# Patient Record
Sex: MALE | Race: OTHER | NOT HISPANIC OR LATINO | Employment: UNEMPLOYED | URBAN - METROPOLITAN AREA
[De-identification: names, ages, dates, MRNs, and addresses within clinical notes are randomized per-mention and may not be internally consistent; named-entity substitution may affect disease eponyms.]

---

## 2022-08-31 ENCOUNTER — HOSPITAL ENCOUNTER (OUTPATIENT)
Facility: HOSPITAL | Age: 42
Discharge: HOME | End: 2022-09-01
Attending: SURGERY | Admitting: SURGERY

## 2022-08-31 ENCOUNTER — ANESTHESIA EVENT (OUTPATIENT)
Dept: SURGERY | Facility: HOSPITAL | Age: 42
End: 2022-08-31

## 2022-08-31 ENCOUNTER — ANESTHESIA (OUTPATIENT)
Dept: SURGERY | Facility: HOSPITAL | Age: 42
End: 2022-08-31

## 2022-08-31 DIAGNOSIS — K61.0 PERIANAL ABSCESS: ICD-10-CM

## 2022-08-31 DIAGNOSIS — K61.1 PERIRECTAL ABSCESS: Primary | ICD-10-CM

## 2022-08-31 LAB
ALBUMIN SERPL-MCNC: 3.9 GM/DL (ref 3.5–5)
ALBUMIN/GLOB SERPL: 1 RATIO (ref 1.1–2)
ALP SERPL-CCNC: 122 UNIT/L (ref 40–150)
ALT SERPL-CCNC: 25 UNIT/L (ref 0–55)
AST SERPL-CCNC: 14 UNIT/L (ref 5–34)
BASOPHILS # BLD AUTO: 0.06 X10(3)/MCL (ref 0–0.2)
BASOPHILS NFR BLD AUTO: 0.5 %
BILIRUBIN DIRECT+TOT PNL SERPL-MCNC: 1 MG/DL
BUN SERPL-MCNC: 20.2 MG/DL (ref 8.9–20.6)
CALCIUM SERPL-MCNC: 10 MG/DL (ref 8.4–10.2)
CHLORIDE SERPL-SCNC: 100 MMOL/L (ref 98–107)
CO2 SERPL-SCNC: 22 MMOL/L (ref 22–29)
CREAT SERPL-MCNC: 0.93 MG/DL (ref 0.73–1.18)
EOSINOPHIL # BLD AUTO: 0.21 X10(3)/MCL (ref 0–0.9)
EOSINOPHIL NFR BLD AUTO: 1.9 %
ERYTHROCYTE [DISTWIDTH] IN BLOOD BY AUTOMATED COUNT: 12.2 % (ref 11.5–17)
EST. AVERAGE GLUCOSE BLD GHB EST-MCNC: 277.6 MG/DL
GFR SERPLBLD CREATININE-BSD FMLA CKD-EPI: >60 MLS/MIN/1.73/M2
GLOBULIN SER-MCNC: 3.8 GM/DL (ref 2.4–3.5)
GLUCOSE SERPL-MCNC: 301 MG/DL (ref 74–100)
HBA1C MFR BLD: 11.3 %
HCT VFR BLD AUTO: 47.3 % (ref 42–52)
HGB BLD-MCNC: 15.9 GM/DL (ref 14–18)
IMM GRANULOCYTES # BLD AUTO: 0.08 X10(3)/MCL (ref 0–0.04)
IMM GRANULOCYTES NFR BLD AUTO: 0.7 %
LYMPHOCYTES # BLD AUTO: 1.73 X10(3)/MCL (ref 0.6–4.6)
LYMPHOCYTES NFR BLD AUTO: 15.3 %
MCH RBC QN AUTO: 28.3 PG (ref 27–31)
MCHC RBC AUTO-ENTMCNC: 33.6 MG/DL (ref 33–36)
MCV RBC AUTO: 84.3 FL (ref 80–94)
MONOCYTES # BLD AUTO: 0.93 X10(3)/MCL (ref 0.1–1.3)
MONOCYTES NFR BLD AUTO: 8.2 %
NEUTROPHILS # BLD AUTO: 8.3 X10(3)/MCL (ref 2.1–9.2)
NEUTROPHILS NFR BLD AUTO: 73.4 %
NRBC BLD AUTO-RTO: 0 %
PLATELET # BLD AUTO: 294 X10(3)/MCL (ref 130–400)
PMV BLD AUTO: 11.1 FL (ref 7.4–10.4)
POCT GLUCOSE: 221 MG/DL (ref 70–110)
POCT GLUCOSE: 233 MG/DL (ref 70–110)
POCT GLUCOSE: 283 MG/DL (ref 70–110)
POCT GLUCOSE: 376 MG/DL (ref 70–110)
POTASSIUM SERPL-SCNC: 3.5 MMOL/L (ref 3.5–5.1)
PROT SERPL-MCNC: 7.7 GM/DL (ref 6.4–8.3)
RBC # BLD AUTO: 5.61 X10(6)/MCL (ref 4.7–6.1)
SARS-COV-2 RDRP RESP QL NAA+PROBE: NEGATIVE
SODIUM SERPL-SCNC: 136 MMOL/L (ref 136–145)
WBC # SPEC AUTO: 11.3 X10(3)/MCL (ref 4.5–11.5)

## 2022-08-31 PROCEDURE — 85025 COMPLETE CBC W/AUTO DIFF WBC: CPT | Performed by: PHYSICIAN ASSISTANT

## 2022-08-31 PROCEDURE — 37000008 HC ANESTHESIA 1ST 15 MINUTES: Performed by: SURGERY

## 2022-08-31 PROCEDURE — 87635 SARS-COV-2 COVID-19 AMP PRB: CPT | Performed by: PHYSICIAN ASSISTANT

## 2022-08-31 PROCEDURE — G0378 HOSPITAL OBSERVATION PER HR: HCPCS

## 2022-08-31 PROCEDURE — 96372 THER/PROPH/DIAG INJ SC/IM: CPT | Performed by: STUDENT IN AN ORGANIZED HEALTH CARE EDUCATION/TRAINING PROGRAM

## 2022-08-31 PROCEDURE — 63600175 PHARM REV CODE 636 W HCPCS: Performed by: STUDENT IN AN ORGANIZED HEALTH CARE EDUCATION/TRAINING PROGRAM

## 2022-08-31 PROCEDURE — 83036 HEMOGLOBIN GLYCOSYLATED A1C: CPT | Performed by: STUDENT IN AN ORGANIZED HEALTH CARE EDUCATION/TRAINING PROGRAM

## 2022-08-31 PROCEDURE — 25000003 PHARM REV CODE 250: Performed by: STUDENT IN AN ORGANIZED HEALTH CARE EDUCATION/TRAINING PROGRAM

## 2022-08-31 PROCEDURE — 63600175 PHARM REV CODE 636 W HCPCS: Performed by: ANESTHESIOLOGY

## 2022-08-31 PROCEDURE — 80053 COMPREHEN METABOLIC PANEL: CPT | Performed by: PHYSICIAN ASSISTANT

## 2022-08-31 PROCEDURE — 63600175 PHARM REV CODE 636 W HCPCS: Performed by: PHYSICIAN ASSISTANT

## 2022-08-31 PROCEDURE — 63600175 PHARM REV CODE 636 W HCPCS: Performed by: NURSE ANESTHETIST, CERTIFIED REGISTERED

## 2022-08-31 PROCEDURE — 37000009 HC ANESTHESIA EA ADD 15 MINS: Performed by: SURGERY

## 2022-08-31 PROCEDURE — 25000003 PHARM REV CODE 250: Performed by: NURSE ANESTHETIST, CERTIFIED REGISTERED

## 2022-08-31 PROCEDURE — 71000033 HC RECOVERY, INTIAL HOUR: Performed by: SURGERY

## 2022-08-31 PROCEDURE — 25000242 PHARM REV CODE 250 ALT 637 W/ HCPCS

## 2022-08-31 PROCEDURE — 96375 TX/PRO/DX INJ NEW DRUG ADDON: CPT

## 2022-08-31 PROCEDURE — 36415 COLL VENOUS BLD VENIPUNCTURE: CPT | Performed by: PHYSICIAN ASSISTANT

## 2022-08-31 PROCEDURE — 96365 THER/PROPH/DIAG IV INF INIT: CPT

## 2022-08-31 PROCEDURE — 82962 GLUCOSE BLOOD TEST: CPT

## 2022-08-31 PROCEDURE — 25500020 PHARM REV CODE 255: Performed by: PHYSICIAN ASSISTANT

## 2022-08-31 PROCEDURE — 36000704 HC OR TIME LEV I 1ST 15 MIN: Performed by: SURGERY

## 2022-08-31 PROCEDURE — 99285 EMERGENCY DEPT VISIT HI MDM: CPT | Mod: 25

## 2022-08-31 PROCEDURE — 36000705 HC OR TIME LEV I EA ADD 15 MIN: Performed by: SURGERY

## 2022-08-31 RX ORDER — GLUCAGON 1 MG
1 KIT INJECTION
Status: DISCONTINUED | OUTPATIENT
Start: 2022-08-31 | End: 2022-09-01 | Stop reason: HOSPADM

## 2022-08-31 RX ORDER — SODIUM CHLORIDE 0.9 % (FLUSH) 0.9 %
10 SYRINGE (ML) INJECTION
Status: DISCONTINUED | OUTPATIENT
Start: 2022-08-31 | End: 2022-09-01 | Stop reason: HOSPADM

## 2022-08-31 RX ORDER — MORPHINE SULFATE 2 MG/ML
2 INJECTION, SOLUTION INTRAMUSCULAR; INTRAVENOUS EVERY 5 MIN PRN
Status: DISCONTINUED | OUTPATIENT
Start: 2022-08-31 | End: 2022-08-31

## 2022-08-31 RX ORDER — ACETAMINOPHEN 325 MG/1
650 TABLET ORAL EVERY 8 HOURS PRN
Status: DISCONTINUED | OUTPATIENT
Start: 2022-08-31 | End: 2022-09-01 | Stop reason: HOSPADM

## 2022-08-31 RX ORDER — INSULIN ASPART 100 [IU]/ML
1-10 INJECTION, SOLUTION INTRAVENOUS; SUBCUTANEOUS EVERY 6 HOURS PRN
Status: DISCONTINUED | OUTPATIENT
Start: 2022-08-31 | End: 2022-09-01 | Stop reason: HOSPADM

## 2022-08-31 RX ORDER — OXYCODONE HYDROCHLORIDE 5 MG/1
5 TABLET ORAL EVERY 4 HOURS PRN
Status: DISCONTINUED | OUTPATIENT
Start: 2022-08-31 | End: 2022-09-01 | Stop reason: HOSPADM

## 2022-08-31 RX ORDER — SODIUM CHLORIDE, SODIUM LACTATE, POTASSIUM CHLORIDE, CALCIUM CHLORIDE 600; 310; 30; 20 MG/100ML; MG/100ML; MG/100ML; MG/100ML
INJECTION, SOLUTION INTRAVENOUS CONTINUOUS
Status: DISCONTINUED | OUTPATIENT
Start: 2022-08-31 | End: 2022-09-01 | Stop reason: HOSPADM

## 2022-08-31 RX ORDER — MIDAZOLAM HYDROCHLORIDE 1 MG/ML
2 INJECTION INTRAMUSCULAR; INTRAVENOUS ONCE AS NEEDED
Status: COMPLETED | OUTPATIENT
Start: 2022-08-31 | End: 2022-08-31

## 2022-08-31 RX ORDER — KETOROLAC TROMETHAMINE 30 MG/ML
INJECTION, SOLUTION INTRAMUSCULAR; INTRAVENOUS
Status: DISCONTINUED | OUTPATIENT
Start: 2022-08-31 | End: 2022-08-31

## 2022-08-31 RX ORDER — MEPERIDINE HYDROCHLORIDE 25 MG/ML
12.5 INJECTION INTRAMUSCULAR; INTRAVENOUS; SUBCUTANEOUS EVERY 10 MIN PRN
Status: DISCONTINUED | OUTPATIENT
Start: 2022-08-31 | End: 2022-08-31

## 2022-08-31 RX ORDER — MIDAZOLAM HYDROCHLORIDE 1 MG/ML
INJECTION INTRAMUSCULAR; INTRAVENOUS
Status: DISPENSED
Start: 2022-08-31 | End: 2022-09-01

## 2022-08-31 RX ORDER — ONDANSETRON 2 MG/ML
4 INJECTION INTRAMUSCULAR; INTRAVENOUS DAILY PRN
Status: DISCONTINUED | OUTPATIENT
Start: 2022-08-31 | End: 2022-08-31

## 2022-08-31 RX ORDER — IPRATROPIUM BROMIDE AND ALBUTEROL SULFATE 2.5; .5 MG/3ML; MG/3ML
SOLUTION RESPIRATORY (INHALATION)
Status: COMPLETED
Start: 2022-08-31 | End: 2022-08-31

## 2022-08-31 RX ORDER — ROCURONIUM BROMIDE 10 MG/ML
INJECTION, SOLUTION INTRAVENOUS
Status: DISCONTINUED | OUTPATIENT
Start: 2022-08-31 | End: 2022-08-31

## 2022-08-31 RX ORDER — TALC
6 POWDER (GRAM) TOPICAL NIGHTLY PRN
Status: DISCONTINUED | OUTPATIENT
Start: 2022-08-31 | End: 2022-09-01 | Stop reason: HOSPADM

## 2022-08-31 RX ORDER — PROPOFOL 10 MG/ML
VIAL (ML) INTRAVENOUS
Status: DISCONTINUED | OUTPATIENT
Start: 2022-08-31 | End: 2022-08-31

## 2022-08-31 RX ORDER — GLYCOPYRROLATE 0.2 MG/ML
INJECTION INTRAMUSCULAR; INTRAVENOUS
Status: DISCONTINUED | OUTPATIENT
Start: 2022-08-31 | End: 2022-08-31

## 2022-08-31 RX ORDER — LIDOCAINE HYDROCHLORIDE 20 MG/ML
INJECTION, SOLUTION EPIDURAL; INFILTRATION; INTRACAUDAL; PERINEURAL
Status: DISCONTINUED | OUTPATIENT
Start: 2022-08-31 | End: 2022-08-31

## 2022-08-31 RX ORDER — LABETALOL HYDROCHLORIDE 5 MG/ML
INJECTION, SOLUTION INTRAVENOUS
Status: DISCONTINUED | OUTPATIENT
Start: 2022-08-31 | End: 2022-08-31

## 2022-08-31 RX ORDER — INSULIN ASPART 100 [IU]/ML
INJECTION, SOLUTION INTRAVENOUS; SUBCUTANEOUS
Status: DISPENSED
Start: 2022-08-31 | End: 2022-09-01

## 2022-08-31 RX ORDER — ONDANSETRON 2 MG/ML
INJECTION INTRAMUSCULAR; INTRAVENOUS
Status: DISCONTINUED | OUTPATIENT
Start: 2022-08-31 | End: 2022-08-31

## 2022-08-31 RX ORDER — KETOROLAC TROMETHAMINE 30 MG/ML
15 INJECTION, SOLUTION INTRAMUSCULAR; INTRAVENOUS
Status: COMPLETED | OUTPATIENT
Start: 2022-08-31 | End: 2022-08-31

## 2022-08-31 RX ORDER — HEPARIN SODIUM 5000 [USP'U]/ML
5000 INJECTION, SOLUTION INTRAVENOUS; SUBCUTANEOUS
Status: DISCONTINUED | OUTPATIENT
Start: 2022-08-31 | End: 2022-09-01 | Stop reason: HOSPADM

## 2022-08-31 RX ORDER — NEOSTIGMINE METHYLSULFATE 1 MG/ML
INJECTION, SOLUTION INTRAVENOUS
Status: DISCONTINUED | OUTPATIENT
Start: 2022-08-31 | End: 2022-08-31

## 2022-08-31 RX ORDER — OXYCODONE HYDROCHLORIDE 5 MG/1
5 TABLET ORAL
Status: DISCONTINUED | OUTPATIENT
Start: 2022-08-31 | End: 2022-08-31

## 2022-08-31 RX ORDER — FENTANYL CITRATE 50 UG/ML
INJECTION, SOLUTION INTRAMUSCULAR; INTRAVENOUS
Status: DISCONTINUED | OUTPATIENT
Start: 2022-08-31 | End: 2022-08-31

## 2022-08-31 RX ADMIN — LIDOCAINE HYDROCHLORIDE 40 MG: 20 INJECTION, SOLUTION EPIDURAL; INFILTRATION; INTRACAUDAL; PERINEURAL at 04:08

## 2022-08-31 RX ADMIN — INSULIN ASPART 5 UNITS: 100 INJECTION, SOLUTION INTRAVENOUS; SUBCUTANEOUS at 11:08

## 2022-08-31 RX ADMIN — IOHEXOL 100 ML: 350 INJECTION, SOLUTION INTRAVENOUS at 01:08

## 2022-08-31 RX ADMIN — KETOROLAC TROMETHAMINE 30 MG: 30 INJECTION, SOLUTION INTRAMUSCULAR; INTRAVENOUS at 04:08

## 2022-08-31 RX ADMIN — HEPARIN SODIUM 5000 UNITS: 5000 INJECTION, SOLUTION INTRAVENOUS; SUBCUTANEOUS at 03:08

## 2022-08-31 RX ADMIN — FENTANYL CITRATE 50 MCG: 50 INJECTION, SOLUTION INTRAMUSCULAR; INTRAVENOUS at 04:08

## 2022-08-31 RX ADMIN — ONDANSETRON 4 MG: 2 INJECTION INTRAMUSCULAR; INTRAVENOUS at 04:08

## 2022-08-31 RX ADMIN — LABETALOL HYDROCHLORIDE 2.5 MG: 5 INJECTION, SOLUTION INTRAVENOUS at 04:08

## 2022-08-31 RX ADMIN — FENTANYL CITRATE 25 MCG: 50 INJECTION, SOLUTION INTRAMUSCULAR; INTRAVENOUS at 04:08

## 2022-08-31 RX ADMIN — PIPERACILLIN AND TAZOBACTAM 4.5 G: 4; .5 INJECTION, POWDER, LYOPHILIZED, FOR SOLUTION INTRAVENOUS; PARENTERAL at 03:08

## 2022-08-31 RX ADMIN — INSULIN ASPART 3 UNITS: 100 INJECTION, SOLUTION INTRAVENOUS; SUBCUTANEOUS at 04:08

## 2022-08-31 RX ADMIN — PROPOFOL 200 MG: 10 INJECTION, EMULSION INTRAVENOUS at 04:08

## 2022-08-31 RX ADMIN — MIDAZOLAM HYDROCHLORIDE 2 MG: 1 INJECTION, SOLUTION INTRAMUSCULAR; INTRAVENOUS at 03:08

## 2022-08-31 RX ADMIN — ACETAMINOPHEN 650 MG: 325 TABLET ORAL at 08:08

## 2022-08-31 RX ADMIN — GLYCOPYRROLATE 0.8 MG: 0.2 INJECTION INTRAMUSCULAR; INTRAVENOUS at 04:08

## 2022-08-31 RX ADMIN — IPRATROPIUM BROMIDE AND ALBUTEROL SULFATE 3 ML: .5; 3 SOLUTION RESPIRATORY (INHALATION) at 05:08

## 2022-08-31 RX ADMIN — KETOROLAC TROMETHAMINE 15 MG: 30 INJECTION, SOLUTION INTRAMUSCULAR; INTRAVENOUS at 11:08

## 2022-08-31 RX ADMIN — PIPERACILLIN AND TAZOBACTAM 4.5 G: 4; .5 INJECTION, POWDER, LYOPHILIZED, FOR SOLUTION INTRAVENOUS; PARENTERAL at 11:08

## 2022-08-31 RX ADMIN — ROCURONIUM BROMIDE 40 MG: 10 SOLUTION INTRAVENOUS at 04:08

## 2022-08-31 RX ADMIN — NEOSTIGMINE METHYLSULFATE 5 MG: 1 INJECTION INTRAVENOUS at 04:08

## 2022-08-31 RX ADMIN — SODIUM CHLORIDE, POTASSIUM CHLORIDE, SODIUM LACTATE AND CALCIUM CHLORIDE: 600; 310; 30; 20 INJECTION, SOLUTION INTRAVENOUS at 03:08

## 2022-08-31 NOTE — H&P
General/Acute Care Surgery   History and Physical     HD#0  POD#* No surgery date entered *    HPI  42M presents with perianal pain for 3 days. Denies f/c/n/v. Last meal was yesterday. He has never had this pain before.     Past Medical History:   ----------------------------  Diabetes mellitus    Surgical History:   No past surgical history on file.    Social History:   Social History     Tobacco Use    Smoking status: Every Day     Types: Cigarettes    Smokeless tobacco: Never       Review of Systems: 10 point ROS negative except as stated in HPI    Scheduled Meds:   piperacillin-tazobactam (ZOSYN) IVPB  4.5 g Intravenous Q8H       Continuous Infusions:    PRN Meds:acetaminophen, dextrose 10%, dextrose 10%, glucagon (human recombinant), heparin (porcine), insulin aspart U-100, melatonin, oxyCODONE, sodium chloride 0.9%     Objective  Temp:  [97.9 °F (36.6 °C)] 97.9 °F (36.6 °C)  Pulse:  [72-99] 83  Resp:  [16] 16  SpO2:  [98 %-100 %] 100 %  BP: (125-147)/() 133/85     No intake/output data recorded.  No intake/output data recorded.     GEN: NAD   NEURO: AAOx3   RESP: No increased WOB, equal rise and fall of the chest   CV: Regular rate   ABD: Soft, non tender, non distended. No guarding or rebound   ENID: fluctuant, tender mass felt on left of anal verge about 3cm diameter  EXT: Moving all extremities spontaneously      Labs  Recent Labs     08/31/22  1152   WBC 11.3   HGB 15.9   HCT 47.3        Recent Labs     08/31/22  1152      K 3.5   CO2 22   BUN 20.2   CREATININE 0.93   CALCIUM 10.0   ALBUMIN 3.9   BILITOT 1.0   AST 14   ALKPHOS 122   ALT 25       Imaging  CT Abdomen Pelvis With Contrast   Final Result      Perianal abscess seen on the left side as outlined above         Electronically signed by: Jaron Aguilar   Date:    08/31/2022   Time:    13:53           Assessment/Plan  42 y.o.male with perianal abscess.    - Admit to general surgery   - Will take to OR now    Desean Koo  MD [facility] General Surgery    8/31/2022  2:40 PM

## 2022-08-31 NOTE — ANESTHESIA POSTPROCEDURE EVALUATION
Anesthesia Post Evaluation    Patient: Les Najera-Esquipulitas    Procedure(s) Performed: Procedure(s) (LRB):  INCISION AND DRAINAGE, BUTTOCK (N/A)    Final Anesthesia Type: general      Patient location during evaluation: PACU  Level of consciousness: sedated  Post-procedure vital signs: reviewed and stable  Airway patency: patent      Anesthetic complications: no      Cardiovascular status: hemodynamically stable  Respiratory status: spontaneous ventilation  Follow-up not needed.          Vitals Value Taken Time   /75 08/31/22 1638   Temp 36.4 °C (97.5 °F) 08/31/22 1638   Pulse 91 08/31/22 1643   Resp 17 08/31/22 1643   SpO2 97 % 08/31/22 1643         No case tracking events are documented in the log.      Pain/Adeline Score: Pain Rating Prior to Med Admin: 10 (8/31/2022 11:55 AM)  Adeline Score: 9 (8/31/2022  4:51 PM)

## 2022-08-31 NOTE — TRANSFER OF CARE
"Anesthesia Transfer of Care Note    Patient: Les Najera-Feleciaqumarylu    Procedure(s) Performed: Procedure(s) (LRB):  INCISION AND DRAINAGE, BUTTOCK (N/A)    Patient location: PACU    Anesthesia Type: general    Transport from OR: Transported from OR on room air with adequate spontaneous ventilation    Post pain: adequate analgesia    Post assessment: no apparent anesthetic complications and tolerated procedure well    Post vital signs: stable    Level of consciousness: sedated    Nausea/Vomiting: no nausea/vomiting    Complications: none    Transfer of care protocol was followed      Last vitals:   Visit Vitals  /88   Pulse 79   Temp 36.6 °C (97.9 °F) (Tympanic)   Resp 16   Ht 5' 6" (1.676 m)   Wt 92 kg (202 lb 13.2 oz)   SpO2 98%   BMI 32.74 kg/m²     "

## 2022-08-31 NOTE — OP NOTE
[facility]  General Surgery  Operative Note    Date of Procedure: 08/31/2022     Procedure: Incision and Drainage, buttocks    Surgeon(s) and Role:  Staff: Shraddha Frost MD  Resident(s): MD Jennifer Butcher MD    Pre-Operative Diagnosis: Perianal Abscess    Post-Operative Diagnosis: Same    Anesthesia: General    Operative Findings: Perianal abscess to Left buttocks, no fistula noticed    Description of Technical Procedures:   Pt was positioned in lithotomy position. DVT and antibiotic prophylaxis were administered. The patient was prepped in the usual fashion using sterile technique. A timeout was walled acknowledging correct pation, allergies, and procedure. A 2cm incision was made approximately 4cm from the anal verge at the 5oclock position. A hemostat was inserted and opened as to drain all appreciable pockets of pus. Bloody purulence poured out approximately 10mL. The pocket was probed and no more pus was appreciated. The pocket was then packed with iodoform packing leaving a 5cm tail. The area was cleaned and the incision was dressed with 4x4s and a piece of tape. All counts correct. Patient was extubated and taken to PACU in stable condition.     Estimated Blood Loss (EBL): 15 cc           Implants: iodoform packing    Drains: none    Specimens: none    Complications: None            Condition: good     Disposition: hospital observation    Desean Koo MD   [facility] General Surgery, PGY1  08/31/2022 4:41 PM

## 2022-08-31 NOTE — ANESTHESIA PROCEDURE NOTES
Intubation    Date/Time: 8/31/2022 4:07 PM  Performed by: Raghavendra Marino CRNA  Authorized by: Mickie Joyce MD     Intubation:     Induction:  Intravenous    Intubated:  Postinduction    Mask Ventilation:  Easy with oral airway    Attempts:  1    Attempted By:  CRNA    Method of Intubation:  Direct    Blade:  Mari 3    Laryngeal View Grade: Grade IIA - cords partially seen      Difficult Airway Encountered?: No      Complications:  None    Airway Device:  Oral endotracheal tube    Airway Device Size:  7.5    Style/Cuff Inflation:  Cuffed (inflated to minimal occlusive pressure)    Inflation Amount (mL):  6    Tube secured:  21    Secured at:  The lips    Placement Verified By:  Capnometry    Complicating Factors:  None    Findings Post-Intubation:  BS equal bilateral

## 2022-08-31 NOTE — ANESTHESIA PREPROCEDURE EVALUATION
08/31/2022  Les Blevins is a 42 y.o., male with PMHx of obesity, smoking presents for I&D buttocks.    NO COVID VACCINE DOCUMENTATION ON CHART   Latest Reference Range & Units 08/31/22 14:18   ID NOW COVID-19, (WILL) Negative  Negative     NO BETA BLOCKER    Active Ambulatory Problems     Diagnosis Date Noted    No Active Ambulatory Problems     Resolved Ambulatory Problems     Diagnosis Date Noted    No Resolved Ambulatory Problems     Past Medical History:   Diagnosis Date    Diabetes mellitus            Pre-op Assessment    I have reviewed the NPO Status.      Review of Systems  Anesthesia Hx:  No problems with previous Anesthesia    Social:  Smoker    Cardiovascular:  Cardiovascular Normal     Pulmonary:  Pulmonary Normal    Renal/:  Renal/ Normal     Hepatic/GI:  Hepatic/GI Normal    Neurological:  Neurology Normal    Endocrine:   Diabetes, poorly controlled, type 2  Obesity / BMI > 30    Vitals:    08/31/22 1300 08/31/22 1315 08/31/22 1416 08/31/22 1418   BP: 125/76  133/85    BP Location:       Patient Position:       Pulse: 89 72 86 83   Resp:       Temp:       TempSrc:       SpO2: 98% 98% 98% 100%   Weight:       Height:             Physical Exam  General: Alert, Cooperative and Well nourished    Airway:  Mallampati: II   Mouth Opening: Normal  TM Distance: Normal  Tongue: Normal  Neck ROM: Normal ROM    Dental:  Intact    Chest/Lungs:  Clear to auscultation, Normal Respiratory Rate    Heart:  Rate: Normal  Rhythm: Regular Rhythm  Sounds: Normal      Lab Results   Component Value Date    WBC 11.3 08/31/2022    HGB 15.9 08/31/2022    HCT 47.3 08/31/2022    MCV 84.3 08/31/2022     08/31/2022      Latest Reference Range & Units 08/31/22 11:08   POCT Glucose 70 - 110 mg/dL 283 (H)   (H): Data is abnormally high    CMP  Sodium Level   Date Value Ref Range Status   08/31/2022 136  136 - 145 mmol/L Final     Potassium Level   Date Value Ref Range Status   08/31/2022 3.5 3.5 - 5.1 mmol/L Final     Carbon Dioxide   Date Value Ref Range Status   08/31/2022 22 22 - 29 mmol/L Final     Blood Urea Nitrogen   Date Value Ref Range Status   08/31/2022 20.2 8.9 - 20.6 mg/dL Final     Creatinine   Date Value Ref Range Status   08/31/2022 0.93 0.73 - 1.18 mg/dL Final     Calcium Level Total   Date Value Ref Range Status   08/31/2022 10.0 8.4 - 10.2 mg/dL Final     Albumin Level   Date Value Ref Range Status   08/31/2022 3.9 3.5 - 5.0 gm/dL Final     Bilirubin Total   Date Value Ref Range Status   08/31/2022 1.0 <=1.5 mg/dL Final     Alkaline Phosphatase   Date Value Ref Range Status   08/31/2022 122 40 - 150 unit/L Final     Aspartate Aminotransferase   Date Value Ref Range Status   08/31/2022 14 5 - 34 unit/L Final     Alanine Aminotransferase   Date Value Ref Range Status   08/31/2022 25 0 - 55 unit/L Final     Lab Results   Component Value Date    HGBA1C 11.3 (H) 08/31/2022       Anesthesia Plan  Type of Anesthesia, risks & benefits discussed:    Anesthesia Type: Gen ETT  Intra-op Monitoring Plan: Standard ASA Monitors  Post Op Pain Control Plan: IV/PO Opioids PRN  Induction:  IV  Airway Plan: Direct  Informed Consent: Informed consent signed with the Patient and all parties understand the risks and agree with anesthesia plan.  All questions answered.   ASA Score: 3  Day of Surgery Review of History & Physical: H&P Update referred to the surgeon/provider.    Ready For Surgery From Anesthesia Perspective.     .

## 2022-08-31 NOTE — ED PROVIDER NOTES
Encounter Date: 8/31/2022       History     Chief Complaint   Patient presents with    Abscess     PT UNSURE IF ABSCESS TO RECTAL AREA, CO PAIN X 2 DAYS.  DENIES DRAINAGE. HX OF DM OUT OF MEDS FOR MONTHS. NO PCP.  .      Reinaldo Hua is a 42 y.o. male who presents to the ED with complaints of a rectal abscess that started 2 day(s) ago. He states their is pain to the area. He denies drainage. Hx of DM.  in triage.        The history is provided by the patient. No  was used.   Review of patient's allergies indicates:  No Known Allergies  Past Medical History:   Diagnosis Date    Diabetes mellitus      No past surgical history on file.  No family history on file.  Social History     Tobacco Use    Smoking status: Every Day     Types: Cigarettes    Smokeless tobacco: Never     Review of Systems   Constitutional:  Negative for chills, fatigue and fever.   HENT:  Negative for congestion, ear pain, sinus pain and sore throat.    Eyes:  Negative for pain.   Respiratory:  Negative for cough, chest tightness and shortness of breath.    Cardiovascular:  Negative for chest pain.   Gastrointestinal:  Negative for abdominal pain, constipation, diarrhea, nausea and vomiting.   Genitourinary:  Negative for dysuria.   Musculoskeletal:  Negative for back pain and joint swelling.   Skin:  Positive for wound. Negative for color change and rash.   Neurological:  Negative for dizziness and weakness.   Psychiatric/Behavioral:  Negative for behavioral problems and confusion.      Physical Exam     Initial Vitals [08/31/22 1110]   BP Pulse Resp Temp SpO2   (!) 143/100 99 16 97.9 °F (36.6 °C) 99 %      MAP       --         Physical Exam    Constitutional: He appears well-developed and well-nourished.   HENT:   Head: Normocephalic and atraumatic.   Nose: Nose normal.   Eyes: Conjunctivae and EOM are normal. Pupils are equal, round, and reactive to light.   Neck: Neck supple. No JVD present.    Normal range of motion.  Cardiovascular:  Normal rate, regular rhythm, normal heart sounds and intact distal pulses.           No murmur heard.  Pulmonary/Chest: Breath sounds normal. No respiratory distress. He has no wheezes. He has no rhonchi. He has no rales.   Abdominal: Abdomen is soft. Bowel sounds are normal. He exhibits no distension. There is no abdominal tenderness. There is no rebound and no guarding.   Genitourinary: Rectum:      Tenderness (perianal abscess felt on rectal exam) present.     Musculoskeletal:         General: No tenderness. Normal range of motion.      Cervical back: Normal range of motion and neck supple.     Lymphadenopathy:     He has no cervical adenopathy.   Neurological: He is alert.   Skin: Skin is warm. Abscess noted.   Psychiatric: He has a normal mood and affect. Thought content normal.       ED Course   Procedures  Labs Reviewed   COMPREHENSIVE METABOLIC PANEL - Abnormal; Notable for the following components:       Result Value    Glucose Level 301 (*)     Globulin 3.8 (*)     Albumin/Globulin Ratio 1.0 (*)     All other components within normal limits   CBC WITH DIFFERENTIAL - Abnormal; Notable for the following components:    MPV 11.1 (*)     IG# 0.08 (*)     All other components within normal limits   POCT GLUCOSE - Abnormal; Notable for the following components:    POCT Glucose 283 (*)     All other components within normal limits   CBC W/ AUTO DIFFERENTIAL    Narrative:     The following orders were created for panel order CBC auto differential.  Procedure                               Abnormality         Status                     ---------                               -----------         ------                     CBC with Differential[[id number]]        Abnormal            Final result                 Please view results for these tests on the individual orders.   EXTRA TUBES    Narrative:     The following orders were created for panel order EXTRA TUBES.  Procedure                                Abnormality         Status                     ---------                               -----------         ------                     Light Blue Top Hold[[id number]]                              In process                 Red Top Hold[[id number]]                                     In process                 Gold Top Hold[[id number]]                                    In process                   Please view results for these tests on the individual orders.   LIGHT BLUE TOP HOLD   RED TOP HOLD   GOLD TOP HOLD   SARS-COV-2 RNA AMPLIFICATION, QUAL   HEMOGLOBIN A1C   POCT GLUCOSE MONITORING CONTINUOUS          Imaging Results              CT Abdomen Pelvis With Contrast (Final result)  Result time 08/31/22 13:53:49      Final result by Jaron Aguilar MD (08/31/22 13:53:49)                   Impression:      Perianal abscess seen on the left side as outlined above      Electronically signed by: Jaron Aguilar  Date:    08/31/2022  Time:    13:53               Narrative:    EXAMINATION:  CT ABDOMEN PELVIS WITH CONTRAST    CLINICAL HISTORY:  concern for perianal abscess;    TECHNIQUE:  Low dose axial images, sagittal and coronal reformations were obtained from the lung bases to the pubic symphysis following the IV administration of contrast. Automatic exposure control (AEC) is utilized to reduce patient radiation exposure.    COMPARISON:  None.    FINDINGS:  The lung bases are clear.    The liver appears normal.  No liver mass or lesion is seen.  Portal and hepatic veins appear normal.    The gallbladder appears normal.  No obvious gallstones are seen.  No biliary dilatation is seen.  No pericholecystic fluid is seen.    The pancreas appears normal.  No pancreatic mass or lesion is seen.    The spleen shows no acute abnormality.    The adrenal glands appear normal.  No adrenal nodule is seen.    The kidneys appear normal in size.  There is some simple cysts seen in both kidneys..  No  hydronephrosis is seen.  No hydroureter is seen.  No nephrolithiasis is seen.  No obvious ureteral stones are seen.    Urinary bladder appears grossly unremarkable.    Reproductive structures appear grossly unremarkable.    No colitis is seen.  No diverticulitis is seen.  No obvious colonic mass or lesion is seen.    There is a perianal abscess seen on the left side of midline that measures 3 cm x 1.5 cm.  There are some associated inflammatory changes seen and some deviation of the gluteal fold to the right.    No free air is seen.  No free fluid is seen.                                       Medications   sodium chloride 0.9% flush 10 mL (has no administration in time range)   melatonin tablet 6 mg (has no administration in time range)   heparin (porcine) injection 5,000 Units (has no administration in time range)   glucagon (human recombinant) injection 1 mg (has no administration in time range)   dextrose 10% bolus 125 mL (has no administration in time range)   dextrose 10% bolus 250 mL (has no administration in time range)   insulin aspart U-100 injection 1-10 Units (has no administration in time range)   oxyCODONE immediate release tablet 5 mg (has no administration in time range)   acetaminophen tablet 650 mg (has no administration in time range)   piperacillin-tazobactam (ZOSYN) 4.5 g in sodium chloride 0.9 % 100 mL IVPB (MB+) (has no administration in time range)   ketorolac injection 15 mg (15 mg Intravenous Given 8/31/22 1155)   iohexoL (OMNIPAQUE 350) injection 100 mL (100 mLs Intravenous Given 8/31/22 1344)                Attending Attestation:   Physician Attestation Statement for Resident:  As the supervising MD   Physician Attestation Statement: I have personally seen and examined this patient.   I agree with the above history.  -:   As the supervising MD I agree with the above PE.     As the supervising MD I agree with the above treatment, course, plan, and disposition.   -: 08/31/2022 3:19 PM     I  have seen this patient and performed an independent face to face history and physical examination, and I agree with all evaluation and management as documented by Ginny REYNOSO.    42 y.o. male presents with symptomatic perianal abscess demonstrated by CT; diabetic; General Surgery taking to OR and admitting.    Trevin Urrutia MD    I have reviewed and agree with the residents interpretation of the following: lab data and CT scans.               ED Course as of 08/31/22 1519   Wed Aug 31, 2022   1400 Reassessed patient at this time. He is laying comfortably in the exam bed. Will consult general surgery at this time for the perianal abscess. He verbalized understanding.  [VJ]   1430 Discussed this case with Dr. Urrutia who will perform an independent history and physical for patient.  [VJ]      ED Course User Index  [VJ] Ginny Manriquez PA-C             Clinical Impression:   Final diagnoses:  [K61.0] Perianal abscess      ED Disposition Condition    Observation                 Ginny Manriquez PA-C  08/31/22 1431       Trevin Urrutia MD  08/31/22 1603

## 2022-08-31 NOTE — ANESTHESIA PROCEDURE NOTES
Intubation    Date/Time: 8/31/2022 4:07 PM  Performed by: Raghavendra Marino CRNA  Authorized by: Mickie Joyce MD     Intubation:     Induction:  Intravenous    Intubated:  Postinduction    Mask Ventilation:  Easy mask    Attempts:  1    Attempted By:  CRNA    Method of Intubation:  Direct    Blade:  Mari 3    Laryngeal View Grade: Grade I - full view of cords      Difficult Airway Encountered?: No      Complications:  None    Airway Device:  Oral endotracheal tube    Airway Device Size:  7.5    Style/Cuff Inflation:  Cuffed    Inflation Amount (mL):  6    Tube secured:  21    Placement Verified By:  Capnometry    Complicating Factors:  None    Findings Post-Intubation:  BS equal bilateral

## 2022-09-01 VITALS
BODY MASS INDEX: 35.32 KG/M2 | DIASTOLIC BLOOD PRESSURE: 78 MMHG | HEIGHT: 66 IN | WEIGHT: 219.81 LBS | TEMPERATURE: 98 F | RESPIRATION RATE: 18 BRPM | HEART RATE: 75 BPM | OXYGEN SATURATION: 97 % | SYSTOLIC BLOOD PRESSURE: 130 MMHG

## 2022-09-01 PROBLEM — K61.0 PERIANAL ABSCESS: Status: RESOLVED | Noted: 2022-09-01 | Resolved: 2022-09-01

## 2022-09-01 PROBLEM — K61.0 PERIANAL ABSCESS: Status: ACTIVE | Noted: 2022-09-01

## 2022-09-01 LAB
POCT GLUCOSE: 193 MG/DL (ref 70–110)
POCT GLUCOSE: 236 MG/DL (ref 70–110)
POCT GLUCOSE: 362 MG/DL (ref 70–110)

## 2022-09-01 PROCEDURE — 96372 THER/PROPH/DIAG INJ SC/IM: CPT | Performed by: STUDENT IN AN ORGANIZED HEALTH CARE EDUCATION/TRAINING PROGRAM

## 2022-09-01 PROCEDURE — G0378 HOSPITAL OBSERVATION PER HR: HCPCS

## 2022-09-01 PROCEDURE — 63600175 PHARM REV CODE 636 W HCPCS: Performed by: STUDENT IN AN ORGANIZED HEALTH CARE EDUCATION/TRAINING PROGRAM

## 2022-09-01 PROCEDURE — 96366 THER/PROPH/DIAG IV INF ADDON: CPT

## 2022-09-01 PROCEDURE — 25000003 PHARM REV CODE 250: Performed by: STUDENT IN AN ORGANIZED HEALTH CARE EDUCATION/TRAINING PROGRAM

## 2022-09-01 RX ORDER — HYDROCODONE BITARTRATE AND ACETAMINOPHEN 5; 325 MG/1; MG/1
1 TABLET ORAL EVERY 6 HOURS PRN
Qty: 5 TABLET | Refills: 0 | OUTPATIENT
Start: 2022-09-01 | End: 2022-10-24

## 2022-09-01 RX ADMIN — INSULIN ASPART 10 UNITS: 100 INJECTION, SOLUTION INTRAVENOUS; SUBCUTANEOUS at 12:09

## 2022-09-01 RX ADMIN — INSULIN ASPART 4 UNITS: 100 INJECTION, SOLUTION INTRAVENOUS; SUBCUTANEOUS at 06:09

## 2022-09-01 RX ADMIN — OXYCODONE HYDROCHLORIDE 5 MG: 5 TABLET ORAL at 07:09

## 2022-09-01 RX ADMIN — PIPERACILLIN AND TAZOBACTAM 4.5 G: 4; .5 INJECTION, POWDER, LYOPHILIZED, FOR SOLUTION INTRAVENOUS; PARENTERAL at 06:09

## 2022-09-01 NOTE — HOSPITAL COURSE
Les Easleyorlincydney is a 42 year-old male with a past medical history of DM who presented to the ED on 8/31/2022 with complaints of a rectal abscess that started 2 days prior to arrival. On 8/31/2022, emergency department consulted general surgery for a possible incision and drainage of a left perianal abscess that was confirmed by CT scan. On 8/31/2022, he underwent an incision and drainage of the left perianal abscess, which had no intraoperative complications. By 9/1/22 and he was tolerating a regular diet, ambulating, and was ready for discharge

## 2022-09-01 NOTE — PROGRESS NOTES
[facility] General Surgery Progress Note    Subjective  POD 1 from left perianal abscess I&D. No acute events overnight, VSS, tmax 97.7. Patient family member acting as  throughout encounter. Endorses slight perianal pain with movement but is well controlled. Tolerating diet, has not had a BM, has passed gas, and excused himself to the bathroom after exam. Denies n/v, f/c, abdominal pain.    Objective  Temp:  [97.3 °F (36.3 °C)-98.5 °F (36.9 °C)] 97.7 °F (36.5 °C)  Pulse:  [66-99] 66  Resp:  [12-20] 18  SpO2:  [97 %-100 %] 99 %  BP: (107-160)/() 135/81    No intake/output data recorded.  I/O last 3 completed shifts:  In: 400 [I.V.:300; IV Piggyback:100]  Out: -     Gen: NAD, AAOx3  CV: extremities wwp, regular rate, palpable radials  Pulm: nonlabored, no increased wob, equal chest rise b/l   Abd: s/nt/nd   Wounds:left perianal abscess s/p I&D with serosanguineous drainage and packing in place    Labs:      Recent Labs   Lab 08/31/22  1152   WBC 11.3   HGB 15.9   HCT 47.3         CHLORIDE 100   CO2 22   BUN 20.2   CREATININE 0.93   GLUCOSE 301*   CALCIUM 10.0   ALKPHOS 122       Micro: none available    Imaging:   - CT abdomen pelvis 8/31: perianal abscess left of midline 3cm x 1.5cm    Path:none available    A/P:    42 y.o. male POD1 from left perianal abscess drainage    - Doing well post-op  - Tolerating regular diet  - Likely discharge today, patient to pull packing at home       Ginny Davis MD  [facility] General Surgery, PGY-3

## 2022-09-01 NOTE — DISCHARGE INSTRUCTIONS
The patient was given discharge instruction. Patient understands discharge information that was present to him.

## 2022-09-01 NOTE — DISCHARGE SUMMARY
[facility] Telemetry  General Surgery  Discharge Summary      Patient Name: Les Blevins  MRN: [id number]  Admission Date: 8/31/2022  Hospital Length of Stay: 0 days  Discharge Date and Time:  09/01/2022 5:27 PM  Attending Physician: No att. providers found   Discharging Provider: Ginny Davis MD  Primary Care Provider: Primary Doctor No    HPI:   No notes on file    Procedure(s) (LRB):  INCISION AND DRAINAGE, BUTTOCK (N/A)      Indwelling Lines/Drains at time of discharge:   Lines/Drains/Airways     None               Hospital Course: Les Blevins is a 42 year-old male with a past medical history of DM who presented to the ED on 8/31/2022 with complaints of a rectal abscess that started 2 days prior to arrival. On 8/31/2022, emergency department consulted general surgery for a possible incision and drainage of a left perianal abscess that was confirmed by CT scan. On 8/31/2022, he underwent an incision and drainage of the left perianal abscess, which had no intraoperative complications. By 9/1/22 and he was tolerating a regular diet, ambulating, and was ready for discharge      Goals of Care Treatment Preferences:  Code Status: Full Code      Pending Diagnostic Studies:     Procedure Component Value Units Date/Time    EXTRA TUBES [[id number]] Collected: 08/31/22 1250    Order Status: Sent Lab Status: In process Updated: 08/31/22 1250    Specimen: Blood, Venous     Narrative:      The following orders were created for panel order EXTRA TUBES.  Procedure                               Abnormality         Status                     ---------                               -----------         ------                     Light Blue Top Hold[[id number]]                              In process                 Red Top Hold[[id number]]                                     In process                 Gold Top Hold[[id number]]                                    In process                    Please view results for these tests on the individual orders.        Final Active Diagnoses:      Problems Resolved During this Admission:    Diagnosis Date Noted Date Resolved POA    PRINCIPAL PROBLEM:  Perianal abscess [K61.0] 09/01/2022 09/01/2022 Unknown      Discharged Condition: good    Disposition: Home or Self Care    Follow Up:    Patient Instructions:      Ambulatory referral/consult to Internal Medicine   Standing Status: Future   Referral Priority: Routine Referral Type: Consultation   Referral Reason: Specialty Services Required   Requested Specialty: Internal Medicine   Number of Visits Requested: 1     Diet Adult Regular     Remove dressing in 24 hours   Order Comments: Remove packing, in shower, in 1 day.     Activity as tolerated     Medications:  Reconciled Home Medications:      Medication List      START taking these medications    HYDROcodone-acetaminophen 5-325 mg per tablet  Commonly known as: NORCO  Take 1 tablet by mouth every 6 (six) hours as needed for Pain.          Time spent on the discharge of patient: 20 minutes    Ginny Davis MD  General Surgery  [facility] Med Surg Telemetry

## 2022-09-01 NOTE — MEDICAL/APP STUDENT
[facility] General Surgery Progress Note    Subjective  Patient is one day post-op from left perianal abscess incision and drainage. He had no acute events overnight and vital signs were stable. He states that he does have some pain in the anus whenever he moves; however, overall he is doing well. He states that he was able to tolerate a regular diet yesterday evening; however, he has not had a bowel movement since 3 days ago. He urinated once yesterday. He denies any fever, chills, nausea, vomiting.     Objective  Temp:  [97.3 °F (36.3 °C)-98.5 °F (36.9 °C)] 97.9 °F (36.6 °C)  Pulse:  [69-99] 71  Resp:  [12-20] 20  SpO2:  [97 %-100 %] 99 %  BP: (107-160)/() 123/77    No intake/output data recorded.  I/O last 3 completed shifts:  In: 400 [I.V.:300; IV Piggyback:100]  Out: -     Gen: NAD, AAOx3  CV: regular rate  Pulm: nonlabored, equal chest rise b/l   Abd: soft, non-tender, non-distended; around anus, there was appropriate tenderness, which was worse on the left side;  Wounds: his dressing was saturated in dried blood    Labs:      Recent Labs   Lab 08/31/22  1152   WBC 11.3   HGB 15.9   HCT 47.3         CHLORIDE 100   CO2 22   BUN 20.2   CREATININE 0.93   GLUCOSE 301*   CALCIUM 10.0   ALKPHOS 122       Imaging  CT Abdomen Pelvis With Contrast   Final Result       Perianal abscess seen on the left side as outlined above           Electronically signed by:       Jaron Aguilar   Date:                                                08/31/2022       A/P:    42 y.o. male with a PMHx of DM presented with a left perianal abscess.     - Daily dressing changes  - Control blood glucose levels  - Diabetic diet  - Monitor I/Os  - Possible wound care evaluation and discharge in afternoon    Octavio Mclaughlin, MS3

## 2022-09-06 ENCOUNTER — OFFICE VISIT (OUTPATIENT)
Dept: SURGERY | Facility: CLINIC | Age: 42
End: 2022-09-06

## 2022-09-06 VITALS
WEIGHT: 204 LBS | TEMPERATURE: 98 F | DIASTOLIC BLOOD PRESSURE: 88 MMHG | RESPIRATION RATE: 18 BRPM | SYSTOLIC BLOOD PRESSURE: 127 MMHG | BODY MASS INDEX: 32.78 KG/M2 | OXYGEN SATURATION: 99 % | HEIGHT: 66 IN | HEART RATE: 94 BPM

## 2022-09-06 DIAGNOSIS — K61.0 PERIANAL ABSCESS: Primary | ICD-10-CM

## 2022-09-06 PROCEDURE — 99213 OFFICE O/P EST LOW 20 MIN: CPT | Mod: PBBFAC

## 2022-09-06 NOTE — PROGRESS NOTES
Surgery Clinic Note:      HPI: Les Blevins is a 42 y.o. male s/p I&D of perianal abscess to left buttock on 8/31/22. Pt reports doing well since surgery, denying significant pain or drainage at surgical site. He reports changing his dressing 3-4/day and the dressings have not been overly soiled when he changes them. He has minimal pain and bleeding at wounds site after BMs, denies any fevers, chills, chest pains, or SOB    Physical Exam:  Vitals:    09/06/22 1320   BP: 127/88   Pulse: 94   Resp: 18   Temp: 98.2 °F (36.8 °C)     Gen: NAD, alert and oriented to person, place, and date  CV: RR, well perfused extremities  Resp: even and unlabored respirations, no accessory muscle use  Abdomen: soft, nontender, nondistended   Extremities: warm and dry  Wound/Incision site: clean site without erythema, induration, or drainage    Assessment/Plan:  Les Blevins is a 42 y.o. male 7 days s/p I&D of perianal abscess, doing well  -local wound care  -RTC PRN    -Aj Reynolds [facility] MS4    Patient seen and examined with Student Doctor Rodolfo. Agree with assessment and plan as documented. Above note reviewed and edited as necessary.    Ginny Davis MD  [facility] General Surgery, PGY-3

## 2022-09-07 LAB — POCT GLUCOSE: 231 MG/DL (ref 70–110)

## 2022-10-24 ENCOUNTER — HOSPITAL ENCOUNTER (EMERGENCY)
Facility: HOSPITAL | Age: 42
Discharge: HOME | End: 2022-10-24
Attending: STUDENT IN AN ORGANIZED HEALTH CARE EDUCATION/TRAINING PROGRAM

## 2022-10-24 VITALS
TEMPERATURE: 98 F | HEART RATE: 66 BPM | RESPIRATION RATE: 16 BRPM | SYSTOLIC BLOOD PRESSURE: 120 MMHG | BODY MASS INDEX: 33.21 KG/M2 | WEIGHT: 211.63 LBS | DIASTOLIC BLOOD PRESSURE: 70 MMHG | HEIGHT: 67 IN | OXYGEN SATURATION: 99 %

## 2022-10-24 DIAGNOSIS — S92.531B: Primary | ICD-10-CM

## 2022-10-24 DIAGNOSIS — S91.114A LACERATION OF FIFTH TOE OF RIGHT FOOT, INITIAL ENCOUNTER: ICD-10-CM

## 2022-10-24 PROCEDURE — 25000003 PHARM REV CODE 250: Performed by: PHYSICIAN ASSISTANT

## 2022-10-24 PROCEDURE — 12041 INTMD RPR N-HF/GENIT 2.5CM/<: CPT

## 2022-10-24 PROCEDURE — 99284 EMERGENCY DEPT VISIT MOD MDM: CPT | Mod: 25

## 2022-10-24 PROCEDURE — 63600175 PHARM REV CODE 636 W HCPCS: Performed by: PHYSICIAN ASSISTANT

## 2022-10-24 PROCEDURE — 90471 IMMUNIZATION ADMIN: CPT | Performed by: PHYSICIAN ASSISTANT

## 2022-10-24 PROCEDURE — 96372 THER/PROPH/DIAG INJ SC/IM: CPT | Performed by: PHYSICIAN ASSISTANT

## 2022-10-24 PROCEDURE — 90715 TDAP VACCINE 7 YRS/> IM: CPT | Performed by: PHYSICIAN ASSISTANT

## 2022-10-24 RX ORDER — CEPHALEXIN 500 MG/1
500 CAPSULE ORAL 4 TIMES DAILY
Qty: 28 CAPSULE | Refills: 0 | Status: SHIPPED | OUTPATIENT
Start: 2022-10-24 | End: 2022-10-31

## 2022-10-24 RX ORDER — HYDROCODONE BITARTRATE AND ACETAMINOPHEN 7.5; 325 MG/1; MG/1
1 TABLET ORAL
Status: COMPLETED | OUTPATIENT
Start: 2022-10-24 | End: 2022-10-24

## 2022-10-24 RX ORDER — CEFAZOLIN SODIUM 1 G/3ML
2 INJECTION, POWDER, FOR SOLUTION INTRAMUSCULAR; INTRAVENOUS
Status: COMPLETED | OUTPATIENT
Start: 2022-10-24 | End: 2022-10-24

## 2022-10-24 RX ORDER — HYDROCODONE BITARTRATE AND ACETAMINOPHEN 5; 325 MG/1; MG/1
1 TABLET ORAL EVERY 6 HOURS PRN
Qty: 12 TABLET | Refills: 0 | Status: SHIPPED | OUTPATIENT
Start: 2022-10-24

## 2022-10-24 RX ORDER — CIPROFLOXACIN 500 MG/1
500 TABLET ORAL 2 TIMES DAILY
Qty: 14 TABLET | Refills: 0 | Status: SHIPPED | OUTPATIENT
Start: 2022-10-24 | End: 2022-10-31

## 2022-10-24 RX ORDER — LIDOCAINE HYDROCHLORIDE 10 MG/ML
5 INJECTION, SOLUTION EPIDURAL; INFILTRATION; INTRACAUDAL; PERINEURAL
Status: COMPLETED | OUTPATIENT
Start: 2022-10-24 | End: 2022-10-24

## 2022-10-24 RX ADMIN — CEFAZOLIN 2 G: 330 INJECTION, POWDER, FOR SOLUTION INTRAMUSCULAR; INTRAVENOUS at 02:10

## 2022-10-24 RX ADMIN — BACITRACIN ZINC, NEOMYCIN, POLYMYXIN B 1 EACH: 400; 3.5; 5 OINTMENT TOPICAL at 03:10

## 2022-10-24 RX ADMIN — LIDOCAINE HYDROCHLORIDE 50 MG: 10 INJECTION, SOLUTION EPIDURAL; INFILTRATION; INTRACAUDAL; PERINEURAL at 02:10

## 2022-10-24 RX ADMIN — HYDROCODONE BITARTRATE AND ACETAMINOPHEN 1 TABLET: 7.5; 325 TABLET ORAL at 12:10

## 2022-10-24 RX ADMIN — LIDOCAINE HYDROCHLORIDE 50 MG: 10 INJECTION, SOLUTION EPIDURAL; INFILTRATION; INTRACAUDAL; PERINEURAL at 01:10

## 2022-10-24 RX ADMIN — TETANUS TOXOID, REDUCED DIPHTHERIA TOXOID AND ACELLULAR PERTUSSIS VACCINE, ADSORBED 0.5 ML: 5; 2.5; 8; 8; 2.5 SUSPENSION INTRAMUSCULAR at 12:10

## 2022-10-24 NOTE — ED PROVIDER NOTES
Encounter Date: 10/24/2022       History     Chief Complaint   Patient presents with    Laceration     C/o saw cut toe at work today. Right 5th toe laceration noted.      Patient with pmhx of DM presents today with his friend who is translating with his consent. Patient reports laceration to right 5th toe that happened about 2 hours ago after a saw cut through his shoe and lacerated the toe. Tdap is not UTD.  Has pcp in [facility] who manages his DM, but can't remember the name.     The history is provided by the patient and a friend. The history is limited by a language barrier. No  was used.   Injury   The incident occurred 2 to 3 hours ago. The incident occurred at work. The injury mechanism was a cut/puncture wound. The injury was related to work. The wounds were not self-inflicted. No protective equipment was used. He came to the ER via by private vehicle. There is an injury to the Right fifth toe. The pain is at a severity of 6/10. It is unknown if a foreign body is present. There is no possibility that he inhaled smoke. Pertinent negatives include no chest pain, no abdominal pain, no nausea, no vomiting, no headaches, no light-headedness and no cough. There have been no prior injuries to these areas. His tetanus status is out of date. He has received no recent medical care.   Review of patient's allergies indicates:  No Known Allergies  Past Medical History:   Diagnosis Date    Diabetes mellitus     Perirectal abscess      Past Surgical History:   Procedure Laterality Date    INCISION AND DRAINAGE OF BUTTOCK N/A 8/31/2022    Procedure: INCISION AND DRAINAGE, BUTTOCK;  Surgeon: Shraddha Frost MD;  Location: [facility];  Service: General;  Laterality: N/A;     History reviewed. No pertinent family history.  Social History     Tobacco Use    Smoking status: Every Day     Types: Cigarettes    Smokeless tobacco: Never   Substance Use Topics    Alcohol use: Never    Drug use: Never     Review of  Systems   Constitutional:  Negative for chills and fever.   HENT:  Negative for congestion, nosebleeds, postnasal drip, rhinorrhea, sinus pressure, sinus pain and sore throat.    Respiratory:  Negative for cough, chest tightness and shortness of breath.    Cardiovascular:  Negative for chest pain, palpitations and leg swelling.   Gastrointestinal:  Negative for abdominal pain, constipation, diarrhea, nausea and vomiting.   Genitourinary:  Negative for dysuria, flank pain and hematuria.   Musculoskeletal:  Negative for arthralgias and myalgias.   Skin:  Positive for wound.   Neurological:  Negative for syncope, light-headedness and headaches.     Physical Exam     Initial Vitals [10/24/22 1110]   BP Pulse Resp Temp SpO2   132/89 88 20 97.7 °F (36.5 °C) 100 %      MAP       --         Physical Exam    Vitals reviewed.  Constitutional: He appears well-developed and well-nourished. He is not diaphoretic. No distress.   HENT:   Head: Normocephalic and atraumatic.   Mouth/Throat: Oropharynx is clear and moist. No oropharyngeal exudate.   Eyes: Conjunctivae and EOM are normal.   Neck: Neck supple.   Normal range of motion.  Cardiovascular:  Normal rate, regular rhythm, normal heart sounds and intact distal pulses.           Pulmonary/Chest: Breath sounds normal. No respiratory distress.   Abdominal: Abdomen is soft. Bowel sounds are normal. He exhibits no distension. There is no abdominal tenderness. There is no rebound and no guarding.   Musculoskeletal:         General: No edema.      Cervical back: Normal range of motion and neck supple.      Comments: Right 5th digit with ~2.5cm laceration noted overlying the anterior aspect of the toe. Bleeding controlled. No palpable or visible foreign body. There is no visible bone or tendons. Limited ROM due to pain, but he is able to flex and extend. NVI, 2+ dp pulses.      Neurological: He is alert and oriented to person, place, and time. GCS score is 15. GCS eye subscore is 4.  GCS verbal subscore is 5. GCS motor subscore is 6.   Skin: Skin is warm and dry. Capillary refill takes less than 2 seconds.   Psychiatric: He has a normal mood and affect.       ED Course   Lac Repair    Date/Time: 10/24/2022 3:18 PM  Performed by: ANGELES Rocha  Authorized by: ANGELES Rocha     Consent:     Consent obtained:  Verbal    Consent given by:  Patient    Risks, benefits, and alternatives were discussed: yes      Risks discussed:  Infection, pain, retained foreign body, poor cosmetic result, poor wound healing, nerve damage, vascular damage and need for additional repair    Alternatives discussed:  No treatment and referral  Universal protocol:     Procedure explained and questions answered to patient or proxy's satisfaction: yes      Imaging studies available: yes      Patient identity confirmed:  Verbally with patient  Anesthesia:     Anesthesia method:  Local infiltration    Local anesthetic:  Lidocaine 1% WITH epi  Laceration details:     Location:  Toe    Toe location:  R little toe    Length (cm):  2.5  Pre-procedure details:     Preparation:  Imaging obtained to evaluate for foreign bodies and patient was prepped and draped in usual sterile fashion  Exploration:     Limited defect created (wound extended): no      Hemostasis achieved with:  Direct pressure    Imaging obtained: x-ray      Imaging outcome: foreign body not noted      Wound exploration: wound explored through full range of motion      Wound extent: underlying fracture      Wound extent: no foreign bodies/material noted      Contaminated: yes    Treatment:     Area cleansed with:  Povidone-iodine and saline    Amount of cleaning:  Extensive    Irrigation solution:  Sterile saline    Irrigation method:  Syringe    Debridement:  Minimal  Skin repair:     Repair method:  Sutures    Suture size:  3-0    Suture material:  Prolene    Suture technique:  Simple interrupted    Number of sutures:  5  Approximation:      Approximation:  Loose  Repair type:     Repair type:  Simple  Post-procedure details:     Dressing:  Antibiotic ointment and non-adherent dressing    Procedure completion:  Tolerated well, no immediate complications  Labs Reviewed - No data to display       Imaging Results              X-Ray Toe 2 or More Views Right (Final result)  Result time 10/24/22 14:33:28      Final result by Felix Martin MD (10/24/22 14:33:28)                   Impression:      Comminuted 5th distal phalanx fracture.      Electronically signed by: Felix Martin  Date:    10/24/2022  Time:    14:33               Narrative:    EXAMINATION:  XR TOE 2 OR MORE VIEWS RIGHT    CLINICAL HISTORY:  trauma;    TECHNIQUE:  Three views of the right 5th toe    COMPARISON:  None    FINDINGS:  Comminuted mildly displaced fracture through the distal phalanx.  Suspect adjacent laceration.  Joint alignments are maintained.                                       Medications   Tdap (BOOSTRIX) vaccine injection 0.5 mL (0.5 mLs Intramuscular Given 10/24/22 1245)   LIDOcaine (PF) 10 mg/ml (1%) injection 50 mg (50 mg Infiltration Given 10/24/22 1345)   HYDROcodone-acetaminophen 7.5-325 mg per tablet 1 tablet (1 tablet Oral Given 10/24/22 1245)   ceFAZolin injection 2 g (2 g Intramuscular Given 10/24/22 1444)   LIDOcaine (PF) 10 mg/ml (1%) injection 50 mg (50 mg Infiltration Given 10/24/22 1400)   neomycin-bacitracnZn-polymyxnB packet (1 each Topical (Top) Given 10/24/22 1514)           APC / Resident Notes:   I was not physically present during the history or exam of this patient. I was available at all times for consultation. I requested Jennifer speak with Dr. Haider concerning care, disposition and management of this patient. Followed his recs. Will f/u in clinic. D/c stable. (Zmora)      ED Course as of 10/26/22 0953   Mon Oct 24, 2022   1501 Patient resting comfortably in exam room. His pain is well controlled. Tdap given as well as abx. Laceration repaired in  ED. Patient placed in ortho shoe and wound was dressed per nursing staff. Discussed with patient that he is at increased risk of infection specifically of the bone since this is an open fracture and also because he has history of diabetes. He verbalized understanding of this and understands his glucose needs to be well controlled. Rx for prophylactic abx given and wound care was discussed. Patient is aware sutures will need removal in 10-12 days. Case was discussed with ortho and patient will f/u in [facility] in 2 days. Patient and friend made aware of this. All questions have been answered. He is stable for discharge. Strict return to ED precautions given.  [SA]      ED Course User Index  [SA] ANGELES Rocha             Clinical Impression:   Final diagnoses:  [S92.531B] Open fracture of distal phalanx of lesser toe of right foot, physeal involvement unspecified, initial encounter (Primary)  [S91.114A] Laceration of fifth toe of right foot, initial encounter      ED Disposition Condition    Discharge Stable          ED Prescriptions       Medication Sig Dispense Start Date End Date Auth. Provider    cephALEXin (KEFLEX) 500 MG capsule Take 1 capsule (500 mg total) by mouth 4 (four) times daily. for 7 days 28 capsule 10/24/2022 10/31/2022 ANGELES Rocha    ciprofloxacin HCl (CIPRO) 500 MG tablet Take 1 tablet (500 mg total) by mouth 2 (two) times daily. for 7 days 14 tablet 10/24/2022 10/31/2022 ANGELES Rocha    HYDROcodone-acetaminophen (NORCO) 5-325 mg per tablet Take 1 tablet by mouth every 6 (six) hours as needed for Pain. 12 tablet 10/24/2022 -- ANGELES Rocha          Follow-up Information       Follow up With Specialties Details Why Contact Info    [facility] - Emergency Dept Emergency Medicine On 11/4/2022 If symptoms worsen return to ED immediately, For suture removal [address] [facility] [address] [phone]    Primary Care  Provider within 1-2 days  Go in 1 day      [facility] - Orthopedics Orthopedics In 2 days Go to the [facility] at 7:30 in the morning. It is located on the 3rd floor of the hospital. [address] [place] [id number] [phone]             ANGELES Rocha  10/24/22 1539       Tyler Trejo MD  10/26/22 2994

## 2022-10-26 ENCOUNTER — OFFICE VISIT (OUTPATIENT)
Dept: ORTHOPEDICS | Facility: CLINIC | Age: 42
End: 2022-10-26

## 2022-10-26 VITALS — BODY MASS INDEX: 33.43 KG/M2 | WEIGHT: 213 LBS | HEIGHT: 67 IN

## 2022-10-26 DIAGNOSIS — S92.531B: ICD-10-CM

## 2022-10-26 DIAGNOSIS — S91.114A LACERATION OF FIFTH TOE OF RIGHT FOOT, INITIAL ENCOUNTER: ICD-10-CM

## 2022-10-26 PROCEDURE — 99213 OFFICE O/P EST LOW 20 MIN: CPT | Mod: PBBFAC

## 2022-10-26 PROCEDURE — 99204 PR OFFICE/OUTPT VISIT, NEW, LEVL IV, 45-59 MIN: ICD-10-PCS | Mod: S$PBB,,, | Performed by: ORTHOPAEDIC SURGERY

## 2022-10-26 PROCEDURE — 99204 OFFICE O/P NEW MOD 45 MIN: CPT | Mod: S$PBB,,, | Performed by: ORTHOPAEDIC SURGERY

## 2022-10-26 NOTE — PROGRESS NOTES
ATTENDING ADDENDBERNARDINO Blevins  was evaluated at the time of the encounter with Dr Canseco PGY5.  HPI, PE and treatment plan was reviewed. Treatment plan was reasonable and necessary. Imaging was reviewed at the time of visit.     I was present during critical or key resident-provided service and procedure portions of the visit.

## 2022-10-26 NOTE — PROGRESS NOTES
[facility] Orthopaedic Surgery Clinic Note    In brief, Les Blevins is a 42 y.o. male sustaining right small toe injury 10/24/2022     Patient states that he was using a hand-held skill saw 10/24/2022.  Lost control of the saw.  Cut through his boot to his small toe.  Initially seen in the emergency department.  Was given updated tetanus as well as IV antibiotics.  Laceration was irrigated and cleaned.  Laceration with suture.  Patient was given oral antibiotics.    Since this time, pain has slightly improved in the small toe.  Still pain with weight-bearing.  States that he has been applying antibiotic cream to the incision.  No prior injuries to his right foot.  Patient is a labor.  Smokes approximately 6-8 cigarettes per day.  No prior surgeries for the foot.  No associated symptoms of infection.    This is a work related injury.  His friend provided interpretation at clinic today.      PMH:   Past Medical History:   Diagnosis Date    Diabetes mellitus     Perirectal abscess        PSH:   Past Surgical History:   Procedure Laterality Date    INCISION AND DRAINAGE OF BUTTOCK N/A 8/31/2022    Procedure: INCISION AND DRAINAGE, BUTTOCK;  Surgeon: Shraddha Frost MD;  Location: [facility];  Service: General;  Laterality: N/A;       SH:   Social History     Socioeconomic History    Marital status:    Tobacco Use    Smoking status: Every Day     Types: Cigarettes    Smokeless tobacco: Never   Substance and Sexual Activity    Alcohol use: Never    Drug use: Never       FH: History reviewed. No pertinent family history.    Allergies: Review of patient's allergies indicates:  No Known Allergies    ROS:  Constitutional- no fever, fatigue, weakness  Eye- no vision loss, eye redness, drainage, or pain  ENMT- no sore throat, ear pain, sinus pain/congestion, nasal congestion/drainage  Respiratory- no cough, wheezing, or shortness of breath  CV- no chest pain, no palpitations, no edema  GI- no N/V/D; no abdominal  pain    Physical Exam:  There were no vitals filed for this visit.    General: NAD  Cardio: RRR by peripheral pulse  Pulm: Normal WOB on room air, symmetric chest rise  Abd: Soft, NT/ND    MSK:  2 cm dorsal laceration at the level the distal phalanx small toe   No evidence of nail bed involvement  Relatively clean wound edges  Some maceration in 4th webspace  No expressible purulence or drainage  Prolene suture in place  Some discoloration extending at the level of the MTP through the distal phalanx of the small toe   Motor grossly intact; TA/GS/EHL/FHL  Patient able to slightly flex and extend the small toe however limited secondary to pain and swelling  DP palpable; digits perfused    Imaging:   Radiographs of the right small toe demonstrate fracture of the distal phalanx    Assessment:  42-year-old male sustaining skill saw injury to the right small toe 10/24/2022 with laceration underlying tuft fracture    At this time, laceration redressed.  Patient should continue antibiotics prescribed in the emergency department.  Cast shoe provided.  Discussed wound care consisting of soap and water.  Do not submerge incision.  He should discontinue putting cream on the incision.  Goal is for a dry healing incision.  Discussed that the patient has risk factors including diabetes and smoking for wound complication.  In this setting there is a chance that he may lose his toe.  He understands this.  He should minimize weight-bearing as much as possible while healing.  Discussed smoking cessation.  Discussed glucose control.  Patient will follow-up in 1 week for wound check, if wound is healing well at this time we will remove sutures.    Deo Canseco MD  [facility] Orthopaedic Surgery  10/26/2022 9:50 AM

## 2022-11-02 ENCOUNTER — OFFICE VISIT (OUTPATIENT)
Dept: ORTHOPEDICS | Facility: CLINIC | Age: 42
End: 2022-11-02

## 2022-11-02 VITALS — HEIGHT: 67 IN | BODY MASS INDEX: 33.12 KG/M2 | WEIGHT: 211 LBS

## 2022-11-02 DIAGNOSIS — S92.531B: Primary | ICD-10-CM

## 2022-11-02 PROCEDURE — 99213 PR OFFICE/OUTPT VISIT, EST, LEVL III, 20-29 MIN: ICD-10-PCS | Mod: S$PBB,,, | Performed by: ORTHOPAEDIC SURGERY

## 2022-11-02 PROCEDURE — 99213 OFFICE O/P EST LOW 20 MIN: CPT | Mod: PBBFAC

## 2022-11-02 PROCEDURE — 99213 OFFICE O/P EST LOW 20 MIN: CPT | Mod: S$PBB,,, | Performed by: ORTHOPAEDIC SURGERY

## 2022-11-02 RX ORDER — CEPHALEXIN 500 MG/1
500 CAPSULE ORAL EVERY 6 HOURS
Qty: 12 CAPSULE | Refills: 0 | Status: SHIPPED | OUTPATIENT
Start: 2022-11-02 | End: 2022-11-05

## 2022-11-02 NOTE — PROGRESS NOTES
[facility] Orthopaedic Surgery Clinic Note    In brief, Les Blevins is a 42-year-old male sustaining skill saw injury to the right small toe 10/24/2022 with laceration underlying tuft fracture    Patient reports he has been doing relatively well.  Taking his antibiotics as prescribed.  He has been performing local wound care.  Pain is gradually been subsiding.  He has been wearing a cast shoe.  Icing and elevating as much as possible.  No associated symptoms of infection.  Continues to have sensation in the toe.    PMH:   Past Medical History:   Diagnosis Date    Diabetes mellitus     Perirectal abscess        PSH:   Past Surgical History:   Procedure Laterality Date    INCISION AND DRAINAGE OF BUTTOCK N/A 8/31/2022    Procedure: INCISION AND DRAINAGE, BUTTOCK;  Surgeon: Shraddha Frost MD;  Location: [facility];  Service: General;  Laterality: N/A;       SH:   Social History     Socioeconomic History    Marital status:    Tobacco Use    Smoking status: Every Day     Types: Cigarettes    Smokeless tobacco: Never   Substance and Sexual Activity    Alcohol use: Never    Drug use: Never       FH: History reviewed. No pertinent family history.    Allergies: Review of patient's allergies indicates:  No Known Allergies    ROS:  Constitutional- no fever, fatigue, weakness  Eye- no vision loss, eye redness, drainage, or pain  ENMT- no sore throat, ear pain, sinus pain/congestion, nasal congestion/drainage  Respiratory- no cough, wheezing, or shortness of breath  CV- no chest pain, no palpitations, no edema  GI- no N/V/D; no abdominal pain    Physical Exam:  There were no vitals filed for this visit.    General: NAD  Cardio: RRR by peripheral pulse  Pulm: Normal WOB on room air, symmetric chest rise  Abd: Soft, NT/ND    MSK:  Dorsal laceration healing well; much improved from last clinic appointment  Prolene sutures in place   No surrounding erythema induration   No drainage  Sensation in the pulp and lateral  aspect of the small toe; some decreased sensation medial aspect of the toe   Motor intact able to flex and extend the small toe   DP palpable; digits perfused    Imaging:   Radiographs of the right small toe demonstrate fracture of the distal phalanx    Assessment:  42-year-old male sustaining skill saw injury to the right small toe 10/24/2022 with laceration underlying tuft fracture    At this time, we will remove the sutures today in clinic.  Soft dressing applied to the small toe.  Continue using cast shoe.  Ice and elevation as much as possible.  Local wound care consisting of gentle soap and water, dried thoroughly, no ointments or creams.  Will prescribe 3 more days of antibiotics and then after this he will be complete with a 10 day course.  Patient will follow-up in approximately 1 week for repeat wound check.  If laceration looks well at this time will space out subsequent appointments.    Deo Canseco MD  [facility]  11/2/2022 9:50 AM

## 2022-11-09 ENCOUNTER — OFFICE VISIT (OUTPATIENT)
Dept: ORTHOPEDICS | Facility: CLINIC | Age: 42
End: 2022-11-09

## 2022-11-09 VITALS — BODY MASS INDEX: 33.12 KG/M2 | HEIGHT: 67 IN | WEIGHT: 211 LBS

## 2022-11-09 DIAGNOSIS — S91.114A LACERATION OF FIFTH TOE OF RIGHT FOOT, INITIAL ENCOUNTER: Primary | ICD-10-CM

## 2022-11-09 PROCEDURE — 99213 OFFICE O/P EST LOW 20 MIN: CPT | Mod: S$PBB,,, | Performed by: ORTHOPAEDIC SURGERY

## 2022-11-09 PROCEDURE — 99213 PR OFFICE/OUTPT VISIT, EST, LEVL III, 20-29 MIN: ICD-10-PCS | Mod: S$PBB,,, | Performed by: ORTHOPAEDIC SURGERY

## 2022-11-09 PROCEDURE — 99213 OFFICE O/P EST LOW 20 MIN: CPT | Mod: PBBFAC

## 2022-11-09 NOTE — PROGRESS NOTES
[facility] Note    In brief, Les Blevins is a 42-year-old male sustaining skill saw injury to the right small toe 10/24/2022 with laceration underlying tuft fracture    Patient states that he is doing significantly better at this appointment compared to last week.  Pain is improving.  He has been performing daily wound care consisting of gentle soap and water.  He has been drying and covering his laceration site.  No signs of infection.  No fevers, no chills.  Completed the full course of his antibiotics.    PMH:   Past Medical History:   Diagnosis Date    Diabetes mellitus     Perirectal abscess        PSH:   Past Surgical History:   Procedure Laterality Date    INCISION AND DRAINAGE OF BUTTOCK N/A 8/31/2022    Procedure: INCISION AND DRAINAGE, BUTTOCK;  Surgeon: Shraddha Frost MD;  Location: [facility];  Service: General;  Laterality: N/A;       SH:   Social History     Socioeconomic History    Marital status:    Tobacco Use    Smoking status: Every Day     Types: Cigarettes    Smokeless tobacco: Never   Substance and Sexual Activity    Alcohol use: Never    Drug use: Never       FH: No family history on file.    Allergies: Review of patient's allergies indicates:  No Known Allergies    ROS:  Constitutional- no fever, fatigue, weakness  Eye- no vision loss, eye redness, drainage, or pain  ENMT- no sore throat, ear pain, sinus pain/congestion, nasal congestion/drainage  Respiratory- no cough, wheezing, or shortness of breath  CV- no chest pain, no palpitations, no edema  GI- no N/V/D; no abdominal pain    Physical Exam:  There were no vitals filed for this visit.    General: NAD  Cardio: RRR by peripheral pulse  Pulm: Normal WOB on room air, symmetric chest rise  Abd: Soft, NT/ND    MSK:  He will dorsal laceration of the right small toe   Small area of scab on the dorsal aspect of the toe at the level of the D IP joint  No surrounding erythema induration   No  drainage  Sensation in the pulp and lateral aspect of the small toe; some decreased sensation medial aspect of the toe   Motor intact able to flex and extend the small toe   DP palpable; digits perfused    Imaging:   Radiographs of the right small toe demonstrate fracture of the distal phalanx    Assessment:  42-year-old male sustaining skill saw injury to the right small toe 10/24/2022 with laceration underlying tuft fracture    At this time, patient will continue local wound care.  He will continue with the cast shoe.  He will follow up in 2 weeks.  This will likely be his last clinic follow-up appointment.  He will gradually return to activities after next visit if everything continues to progress.  Patient in agreement with this treatment plan.    Deo Canseco MD  [facility] Orthopaedic Surgery  11/9/2022 9:50 AM

## 2022-11-23 ENCOUNTER — OFFICE VISIT (OUTPATIENT)
Dept: ORTHOPEDICS | Facility: CLINIC | Age: 42
End: 2022-11-23

## 2022-11-23 VITALS — BODY MASS INDEX: 33.12 KG/M2 | WEIGHT: 211 LBS | HEIGHT: 67 IN

## 2022-11-23 DIAGNOSIS — S92.531B: Primary | ICD-10-CM

## 2022-11-23 PROCEDURE — 99213 OFFICE O/P EST LOW 20 MIN: CPT | Mod: PBBFAC

## 2022-11-23 PROCEDURE — 99213 OFFICE O/P EST LOW 20 MIN: CPT | Mod: S$PBB,,, | Performed by: ORTHOPAEDIC SURGERY

## 2022-11-23 PROCEDURE — 99213 PR OFFICE/OUTPT VISIT, EST, LEVL III, 20-29 MIN: ICD-10-PCS | Mod: S$PBB,,, | Performed by: ORTHOPAEDIC SURGERY

## 2022-11-23 NOTE — PROGRESS NOTES
[facility] Orthopaedic Surgery Clinic Note    In brief, Les Blevins is a 42-year-old male sustaining skill saw injury to the right small toe 10/24/2022 with laceration underlying tuft fracture    Patient is doing extremely well.  Having essentially no pain in his right small toe today.  He has been ambulating in a cast shoe.  He is eager to get back to work.  Sensation has been maintained in the small toe.  Overall doing well.    PMH:   Past Medical History:   Diagnosis Date    Diabetes mellitus     Perirectal abscess        PSH:   Past Surgical History:   Procedure Laterality Date    INCISION AND DRAINAGE OF BUTTOCK N/A 8/31/2022    Procedure: INCISION AND DRAINAGE, BUTTOCK;  Surgeon: Shraddha Frost MD;  Location: [facility];  Service: General;  Laterality: N/A;       SH:   Social History     Socioeconomic History    Marital status:    Tobacco Use    Smoking status: Every Day     Types: Cigarettes    Smokeless tobacco: Never   Substance and Sexual Activity    Alcohol use: Never    Drug use: Never       FH: No family history on file.    Allergies: Review of patient's allergies indicates:  No Known Allergies    ROS:  Constitutional- no fever, fatigue, weakness  Eye- no vision loss, eye redness, drainage, or pain  ENMT- no sore throat, ear pain, sinus pain/congestion, nasal congestion/drainage  Respiratory- no cough, wheezing, or shortness of breath  CV- no chest pain, no palpitations, no edema  GI- no N/V/D; no abdominal pain    Physical Exam:  There were no vitals filed for this visit.    General: NAD  Cardio: RRR by peripheral pulse  Pulm: Normal WOB on room air, symmetric chest rise  Abd: Soft, NT/ND    MSK:    Significantly decreased swelling   No tenderness throughout the distal middle and proximal phalanx of the small toe   Incision well healed with scab overlying   No surrounding erythema induration   Motor intact able to extend and flex the toe   Sensation intact to light touch medial and lateral  aspect of the toe  DP palpable; digits perfused    Imaging:    no new imaging    Assessment:  42-year-old male sustaining skill saw injury to the right small toe 10/24/2022 with laceration underlying tuft fracture    At this time, patient may transition to a hard-soled shoe.  He may be cleared to return to work.  Would recommend wearing steel toe boots in order to protect against any impact to his toe.  Patient may follow-up as needed.    Deo Canseco MD  [facility] Orthopaedic Surgery  11/23/2022 9:50 AM

## 2022-11-23 NOTE — PROGRESS NOTES
ATTENDING ADDENDBERNARDINO Blevins  was evaluated at the time of the encounter with Dr Canseco PGY5.  HPI, PE and treatment plan was reviewed. Treatment plan was reasonable and necessary. Imaging was reviewed at the time of visit.     I was present during critical or key resident-provided service and procedure portions of the visit.    Patient understands that future orthopaedic follow-up will be with [facility] orthopaedic staff and [facility] residents.

## (undated) DEVICE — GLOVE PROTEXIS LTX MICRO 6

## (undated) DEVICE — Device

## (undated) DEVICE — GLOVE PROTEXIS BLUE LATEX 8

## (undated) DEVICE — GAUZE SPONGE 4X4 12PLY

## (undated) DEVICE — GLOVE PROTEXIS PI SYN SURG 6.0

## (undated) DEVICE — GLOVE PROTEXIS HYDROGEL SZ7

## (undated) DEVICE — SOL 9P NACL IRR PIC IL

## (undated) DEVICE — GLOVE PROTEXIS LTX MICRO 8

## (undated) DEVICE — MANIFOLD 4 PORT

## (undated) DEVICE — HANDLE DEVON RIGID OR LIGHT

## (undated) DEVICE — PACKING STRIP IDOFORM 1/4X5YD

## (undated) DEVICE — DRAPE UNDERBUTTOCKS PCH STRL

## (undated) DEVICE — GLOVE PROTEXIS PI SYN SURG 7.5

## (undated) DEVICE — GLOVE PROTEXIS BLUE LATEX 7.5

## (undated) DEVICE — LEGGING SURG CONV 43X28IN